# Patient Record
Sex: FEMALE | Race: WHITE
[De-identification: names, ages, dates, MRNs, and addresses within clinical notes are randomized per-mention and may not be internally consistent; named-entity substitution may affect disease eponyms.]

---

## 2017-03-21 ENCOUNTER — HOSPITAL ENCOUNTER (EMERGENCY)
Dept: HOSPITAL 62 - ER | Age: 52
Discharge: HOME | End: 2017-03-21
Payer: MEDICAID

## 2017-03-21 VITALS — SYSTOLIC BLOOD PRESSURE: 145 MMHG | DIASTOLIC BLOOD PRESSURE: 96 MMHG
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
## 2017-03-21 DIAGNOSIS — S29.9XXA: Primary | ICD-10-CM

## 2017-03-21 DIAGNOSIS — F17.200: ICD-10-CM

## 2017-03-21 PROCEDURE — 99283 EMERGENCY DEPT VISIT LOW MDM: CPT

## 2017-03-21 NOTE — ER DOCUMENT REPORT
HPI





- HPI


Patient complains to provider of: injured left side of ribs


Onset: Other - Friday


Quality of pain: Achy


Pain Level: 5


Context: 


51-year-old female pulled her trashcan on Friday and felt a pop in the left 

side of her chest lateral ribs.  She thinks she broke something.    No 

Shortness of breath.


Associated Symptoms: None


Exacerbated by: Movement, Coughing


Relieved by: Denies


Similar symptoms previously: No


Recently seen / treated by doctor: No





- ROS


ROS below otherwise negative: Yes


Systems Reviewed and Negative: Yes All other systems reviewed and negative





- DERM


Skin Color: Normal





Past Medical History





- General


Information source: Patient





- Social History


Smoking Status: Current Every Day Smoker


Chew tobacco use (# tins/day): No


Frequency of alcohol use: Occasional


Drug Abuse: None


Lives with: Family


Family History: Reviewed & Not Pertinent


Patient has suicidal ideation: No


Patient has homicidal ideation: No





- Medical History


Medical History: Negative


Renal/ Medical History: Denies: Hx Peritoneal Dialysis


Past Surgical History: Reports: Hx  Section, Hx Cholecystectomy, Hx 

Hysterectomy





Vertical Provider Document





- CONSTITUTIONAL


Agree With Documented VS: Yes


Exam Limitations: No Limitations





- INFECTION CONTROL


TRAVEL OUTSIDE OF THE [place] IN LAST 30 DAYS: No





- HEENT


HEENT: Atraumatic, Normocephalic





- NECK


Neck: Supple.  negative: Lymphadenopathy-Left, Lymphadenopathy-Right





- RESPIRATORY


Respiratory: Breath Sounds Normal, Other - tender left lateral chest wall under 

axilla over  muscle that extends to thoracic back


O2 Sat by Pulse Oximetry: 98





- CARDIOVASCULAR


Cardiovascular: Regular Rate, Regular Rhythm





- GI/ABDOMEN


Gastrointestinal: Abdomen Soft, Abdomen Non-Tender, No Organomegaly





- MUSCULOSKELETAL/EXTREMETIES


Musculoskeletal/Extremeties: MAEW, FROM





- NEURO


Level of Consciousness: Awake, Alert


Motor/Sensory: No Motor Deficit, No Sensory Deficit





- DERM


Integumentary: Warm, Dry, No Rash





Course





- Vital Signs


Vital signs: 


 











Temp Pulse Resp BP Pulse Ox


 


 98.2 F   95   20   145/96 H  98 


 


 17 13:32  17 13:32  17 13:32  17 13:32  17 13:32














Discharge





- Discharge


Clinical Impression: 


 left rib strain





Condition: Good


Disposition: HOME, SELF-CARE


Instructions:  Muscle Strain ([facility]), Ultram ([facility]), Acetaminophen


Additional Instructions: 


warm compress


to er if worse





Please complete the patient satisfaction survey if you get one, and return it.. 

If you do not receive a survey,  then you can go to the [facility] website, [email or web address] 

and place your comments about your very good care. Thank you very much. It was 

a pleasure being your medical provider today.


Prescriptions: 


Tramadol HCl [Ultram 50 mg Tablet] 50 mg PO ASDIR PRN #20 tablet


 PRN Reason: 


Forms:  Return to Work

## 2017-03-21 NOTE — ER DOCUMENT REPORT
ED Medical Screen (RME)





- General


Stated Complaint: LEFT RIB PAIN


Notes: 


Patient states she was pulling on trash can and felt a crack to her left ribs 

on Friday.  Complains of pain and shortness of breath when taking deep breaths.

  Patient has COPD and emphysema.  No fever.





I have greeted and performed a rapid initial assessment of this patient.  A 

comprehensive ED assessment and evaluation of the patient, analysis of test 

results and completion of the medical decision making process will be conducted 

by additional ED providers.


TRAVEL OUTSIDE OF THE [place] IN LAST 30 DAYS: No





- Related Data


Allergies/Adverse Reactions: 


 





codeine Allergy (Verified 16 15:33)


 


latex Allergy (Verified 16 15:33)


 


NSAIDS (Non-Steroidal Anti-Inflamma Allergy (Verified 16 15:33)


 











Past Medical History


Past Surgical History: Reports: Hx  Section, Hx Cholecystectomy, Hx 

Hysterectomy





Physical Exam





- Respiratory


Respiratory status: No respiratory distress


Breath sounds: Normal - tender left lateral ribs

## 2017-05-11 ENCOUNTER — HOSPITAL ENCOUNTER (EMERGENCY)
Dept: HOSPITAL 62 - ER | Age: 52
Discharge: HOME | End: 2017-05-11
Payer: MEDICAID

## 2017-05-11 VITALS — DIASTOLIC BLOOD PRESSURE: 81 MMHG | SYSTOLIC BLOOD PRESSURE: 130 MMHG

## 2017-05-11 DIAGNOSIS — Z90.710: ICD-10-CM

## 2017-05-11 DIAGNOSIS — F17.210: ICD-10-CM

## 2017-05-11 DIAGNOSIS — J44.9: ICD-10-CM

## 2017-05-11 DIAGNOSIS — L02.411: Primary | ICD-10-CM

## 2017-05-11 DIAGNOSIS — Z90.49: ICD-10-CM

## 2017-05-11 PROCEDURE — 87186 SC STD MICRODIL/AGAR DIL: CPT

## 2017-05-11 PROCEDURE — 87205 SMEAR GRAM STAIN: CPT

## 2017-05-11 PROCEDURE — 87077 CULTURE AEROBIC IDENTIFY: CPT

## 2017-05-11 PROCEDURE — 99283 EMERGENCY DEPT VISIT LOW MDM: CPT

## 2017-05-11 PROCEDURE — 87070 CULTURE OTHR SPECIMN AEROBIC: CPT

## 2017-05-11 PROCEDURE — 0H9BXZZ DRAINAGE OF RIGHT UPPER ARM SKIN, EXTERNAL APPROACH: ICD-10-PCS | Performed by: NURSE PRACTITIONER

## 2017-05-11 NOTE — ER DOCUMENT REPORT
HPI





- HPI


Patient complains to provider of: abscess


Onset: Other - 3 weeks


Onset/Duration: Persistent


Quality of pain: Achy, Burning


Severity: Severe


Pain Level: 5


Context: 


Patient presents emergency department with multiple abscesses to her right 

axilla.  Patient reports that she's had these abscesses since she shaved 

approximately 3 weeks ago.  She reports she's applied black salve and opened 

the sites herself with a needle.  She denies history of MRSA.  She denies fever 

vomiting diarrhea.


Associated Symptoms: None


Exacerbated by: Denies


Relieved by: Denies


Similar symptoms previously: No


Recently seen / treated by doctor: No





- DERM


Skin Color: Normal





Past Medical History





- General


Information source: Patient





- Social History


Smoking Status: Current Every Day Smoker


Cigarette use (# per day): Yes


Chew tobacco use (# tins/day): No


Frequency of alcohol use: Occasional


Drug Abuse: None


Occupation: store


Lives with: Family


Family History: Reviewed & Not Pertinent


Patient has suicidal ideation: No


Patient has homicidal ideation: No


Pulmonary Medical History: Reports: Hx COPD


Renal/ Medical History: Denies: Hx Peritoneal Dialysis


Past Surgical History: Reports: Hx  Section - x 2, Hx Cholecystectomy, 

Hx Hysterectomy





- Immunizations


Hx Diphtheria, Pertussis, Tetanus Vaccination: Yes





Vertical Provider Document





- CONSTITUTIONAL


Agree With Documented VS: Yes


Exam Limitations: No Limitations


General Appearance: WD/WN, Mild Distress - winces when abscesses probed, anxious





- INFECTION CONTROL


TRAVEL OUTSIDE OF THE [place] IN LAST 30 DAYS: No





- HEENT


HEENT: Atraumatic, Normocephalic





- NECK


Neck: Normal Inspection, Supple.  negative: Lymphadenopathy-Left, 

Lymphadenopathy-Right





- RESPIRATORY


Respiratory: Breath Sounds Normal, No Respiratory Distress


O2 Sat by Pulse Oximetry: 98





- CARDIOVASCULAR


Cardiovascular: Regular Rate





- GI/ABDOMEN


Gastrointestinal: Abdomen Soft





- MUSCULOSKELETAL/EXTREMETIES


Musculoskeletal/Extremeties: MAEW, FROM





- NEURO


Level of Consciousness: Awake, Alert, Appropriate


Motor/Sensory: No Motor Deficit





- DERM


Integumentary: Warm, Dry, Abscess - 3 abscesses under right axillae, fluctuant





Course





- Re-evaluation


Re-evalutation: 





17 10:44


3 abscesses were opened under the patient's right arm.  #1 horizontal 

laceration made large amount of drainage.  #2 T-incision made with a large 

amount of drainage area probed, #3 small abscess small incision made positive 

drainage.  Patient tolerated procedure well.  Patient instructed on Septra 

signs and symptoms of allergic reaction.  Patient also instructed on [place].  

She reports she's taken before without any problems.  She was instructed to 

return to the emergency department for increasing abscesses or concerns.  She 

verbalized understanding to all instructions.  Patient does have an appointment 

with her primary care provider next week.





- Vital Signs


Vital signs: 


 











Temp Pulse Resp BP Pulse Ox


 


 98.6 F   93   18   112/64   98 


 


 17 08:30  17 08:30  17 08:30  17 08:30  17 08:30














Procedures





- Incision and Drainage


  ** Right Axillae


Time completed: 10:40


Type: Simple


Anesthetic type: 1% Lidocaine


mL's of anesthetic: 5


I&D procedure: Betadine prep applied


Incision Method: Incision made by scalpel


Amount/type of drainage: patient had 3 abscesses under her right axilla with 

large amount of drainag





Discharge





- Discharge


Clinical Impression: 


 abscess incison and drainage





Condition: Stable


Disposition: HOME, SELF-CARE


Instructions:  Abscess ([facility]), Post Incision and Drainage, Trimethoprim-Sulfa (

[facility]), Oral Narcotic Medication ([facility])


Additional Instructions: 


*You have been treated for an abscess with incision and drainage


*Take medication as prescribed


*Monitor the site for signs of increasing infection such as increasing pain,


  redness, swelling, warmth


*Wash the site twice daily as discussed


*Follow up with a primary care provider as scheduled for recheck


*Contact the culture nurse sophy 9-4 at [phone] to inquire about your culture


*Return to ED for signs of increasing infection, worsening condition,


  changes, needs


Prescriptions: 


Hydrocodone/Acetaminophen [Norco 5-325 Tablet] 1 each PO QID #15 tablet


Sulfamethoxazole/Trimethoprim [Bactrim Ds Tablet] 1 each PO BID #20 tablet


Forms:  Return to Work

## 2020-02-21 ENCOUNTER — HOSPITAL ENCOUNTER (EMERGENCY)
Dept: HOSPITAL 62 - ER | Age: 55
Discharge: HOME | End: 2020-02-21
Payer: SELF-PAY

## 2020-02-21 VITALS — DIASTOLIC BLOOD PRESSURE: 51 MMHG | SYSTOLIC BLOOD PRESSURE: 126 MMHG

## 2020-02-21 DIAGNOSIS — M54.42: Primary | ICD-10-CM

## 2020-02-21 DIAGNOSIS — J44.9: ICD-10-CM

## 2020-02-21 DIAGNOSIS — F17.200: ICD-10-CM

## 2020-02-21 PROCEDURE — 99283 EMERGENCY DEPT VISIT LOW MDM: CPT

## 2020-02-21 NOTE — ER DOCUMENT REPORT
HPI





- HPI


Patient complains to provider of: Low back pain


Time Seen by Provider: 20 22:47


Pain Level: 5


Notes: 





54-year-old female to the emergency department with complaints of low left-sided

back pain that radiates down the back of her leg all the way down to her foot 

for the past 2 weeks.  She states that she has a known history of sciatica and 

this feels like an acute flare of it.  She states that she has been taking 

ibuprofen at home with not a lot of relief.  She denies any fevers or chills.  

She denies any IV drug abuse.  She denies any weakness in the leg.  She states 

that she has not had any bladder or bowel incontinence.  She has not had any 

urinary retention.  She not had any saddle paresthesia.  She does not see a 

specialist for her back.





- CONSTITUTIONAL


Constitutional: DENIES: Fever, Chills





- EENT


EENT: DENIES: Sore Throat, Ear Pain





- NEURO


Neurology: DENIES: Headache





- CARDIOVASCULAR


Cardiovascular: DENIES: Chest pain





- RESPIRATORY


Respiratory: DENIES: Trouble Breathing, Coughing





- GASTROINTESTINAL


Gastrointestinal: DENIES: Abdominal Pain, Nausea, Patient vomiting, Diarrhea





- MUSCULOSKELETAL


Musculoskeletal: REPORTS: Back Pain





- DERM


Skin Color: Normal


Skin Problems: None





Past Medical History





- General


Information source: Patient





- Social History


Smoking Status: Current Every Day Smoker


Frequency of alcohol use: None


Drug Abuse: None


Lives with: Spouse/Significant other


Family History: Reviewed & Not Pertinent


Patient has suicidal ideation: No


Patient has homicidal ideation: No


Pulmonary Medical History: Reports: Hx COPD


Renal/ Medical History: Denies: Hx Peritoneal Dialysis


Past Surgical History: Reports: Hx  Section - x 2, Hx Cholecystectomy, 

Hx Hysterectomy





- Immunizations


Hx Diphtheria, Pertussis, Tetanus Vaccination: Yes





Vertical Provider Document





- CONSTITUTIONAL


Agree With Documented VS: Yes


Exam Limitations: No Limitations


General Appearance: WD/WN, No Apparent Distress





- INFECTION CONTROL


TRAVEL OUTSIDE OF THE [place] IN LAST 30 DAYS: No





- HEENT


HEENT: Atraumatic, Normal ENT Exam, Normocephalic, PERRLA





- NECK


Neck: Normal Inspection, Supple





- RESPIRATORY


Respiratory: Breath Sounds Normal, No Respiratory Distress.  negative: Rales, 

Rhonchi, Wheezing





- CARDIOVASCULAR


Cardiovascular: Regular Rate, Regular Rhythm, No Murmur





- GI/ABDOMEN


Gastrointestinal: Abdomen Soft, Abdomen Non-Tender, No Organomegaly





- MUSCULOSKELETAL/EXTREMETIES


Notes: 





There is no tenderness to palpation over the midline cervical and thoracic spine

with no step-off or deformity.  There is mild tenderness to palpation over the 

lumbar midline spine with noted tenderness palpation over the lumbar paraspinal 

in the left lumbar sacral region.  Negative straight leg raise bilaterally.  

Patient is able to get up and walk around the triage room with a ease and 

actually states that walking and standing makes it better.  She has full range 

of motion of bilateral lower extremities against resistance with 5 out of 5 

strength in flexion and extension.





- NEURO


Level of Consciousness: Awake, Alert, Appropriate





- DERM


Integumentary: Warm, Dry, No Rash





Course





- Re-evaluation


Re-evalutation: 





Impression: Sciatica.  Will discharge the patient home with muscle relaxant, 

gabapentin low-dose, Medrol Dosepak.  Encouraged follow-up with orthopedist.  

Patient agrees with the plan.  Encouraged to return if any emergent back 

symptoms.  Doubt infectious etiology for back pain and and no emergent symptoms 

to suggest cauda equina.








- Vital Signs


Vital signs: 


                                        











Temp Pulse Resp BP Pulse Ox


 


 97.6 F   80   16   126/51 H  100 


 


 20 22:29  20 22:29  20 22:29  20 22:29  20 22:29














Discharge





- Discharge


Clinical Impression: 


Low back pain


Qualifiers:


 Chronicity: acute Back pain laterality: left Sciatica presence: with sciatica 

Sciatica laterality: sciatica of left side Qualified Code(s): M54.42 - Lumbago 

with sciatica, left side





Condition: Stable


Disposition: HOME, SELF-CARE


Instructions:  Sciatica ([facility])


Additional Instructions: 


TAKE MEDICINES AS PRESCRIBED.  FOLLOW UP WITH ORTHOPEDIST.   ALTERNATE BETWEEN 

ICE AND HEAT. 


Prescriptions: 


Methylprednisolone [Medrol Dosepack (4 mg/Tab) 21 Tab/Dosepak] 4 mg PO ASDIR PRN

#21 tab.ds.pk


 PRN Reason: 


Gabapentin [Neurontin 100 mg Capsule] 100 mg PO Q8H #15 capsule


Methocarbamol [Robaxin 500 mg Tablet] 500 mg PO QID PRN #20 tablet


 PRN Reason: 


Referrals: 


CHEYANNE LUCERO JR, DO [ACTIVE PROVISIONAL STAFF] - Follow up in 3-5 days

## 2020-03-04 ENCOUNTER — HOSPITAL ENCOUNTER (EMERGENCY)
Dept: HOSPITAL 62 - ER | Age: 55
LOS: 1 days | Discharge: HOME | End: 2020-03-05
Payer: SELF-PAY

## 2020-03-04 DIAGNOSIS — M54.32: Primary | ICD-10-CM

## 2020-03-04 DIAGNOSIS — F17.200: ICD-10-CM

## 2020-03-04 DIAGNOSIS — Z88.5: ICD-10-CM

## 2020-03-04 DIAGNOSIS — Z79.899: ICD-10-CM

## 2020-03-04 DIAGNOSIS — J44.9: ICD-10-CM

## 2020-03-04 DIAGNOSIS — Z91.040: ICD-10-CM

## 2020-03-04 DIAGNOSIS — Z88.6: ICD-10-CM

## 2020-03-04 PROCEDURE — 99283 EMERGENCY DEPT VISIT LOW MDM: CPT

## 2020-03-04 PROCEDURE — 93971 EXTREMITY STUDY: CPT

## 2020-03-04 NOTE — ER DOCUMENT REPORT
ED Medical Screen (RME)





- General


Chief Complaint: Leg Pain


Stated Complaint: LEG PAIN


Time Seen by Provider: 20 19:34


Notes: 





HPI:  54 year old female presenting to the ER for evaluation of continuing left 

leg pain.  States she has had pain from her lower back through the gluteal 

region down the left leg in the posterior aspect for 3 weeks.  States she was 

here a week and a half ago and was treated for sciatic issues with prednisone a

nd gabapentin but it did not help.  She did not follow-up with her primary care 

provider or orthopedist stating that she does not have insurance to do so.  No 

incontinence of urine or bowel





I have greeted and performed a rapid initial assessment of this patient.  A 

comprehensive ED assessment and evaluation of the patient, analysis of test 

results and completion of the medical decision making process will be conducted 

by additional ED providers








PHYSICAL EXAMINATION:





GENERAL: Well-appearing, well-nourished and in mild acute distress.


HEAD: Atraumatic, normocephalic.


EYES:  sclera anicteric, conjunctiva are normal.


ENT: Moist mucous membranes.


NECK: Normal range of motion


LUNGS: Normal work of breathing


HEART: 2+ radial pulses bilaterally


ABD: limited by positioning for exam in triage.  


EXTREMITIES:  No cyanosis.  Mild tenderness to the left gluteal and posterior 

hamstring and calf region on palpation no visible edema


NEUROLOGICAL: No focal neurological deficits. Moves all extremities 

spontaneously and on command.


PSYCH: Normal mood, normal affect.


SKIN: Warm, Dry, normal turgor, no rashes or lesions noted.








TRAVEL OUTSIDE OF THE [place] IN LAST 30 DAYS: No





- Related Data


Allergies/Adverse Reactions: 


                                        





codeine Allergy (Verified 17 08:29)


   


latex Allergy (Verified 17 08:29)


   


NSAIDS (Non-Steroidal Anti-Inflamma Allergy (Verified 17 08:29)


   











Past Medical History


Pulmonary Medical History: Reports: Hx COPD


Renal/ Medical History: Denies: Hx Peritoneal Dialysis


Past Surgical History: Reports: Hx  Section - x 2, Hx Cholecystectomy, 

Hx Hysterectomy





- Immunizations


Hx Diphtheria, Pertussis, Tetanus Vaccination: Yes





Physical Exam





- Vital signs


Vitals: 





                                        











Temp Pulse Resp BP Pulse Ox


 


 98.0 F   75   16   126/61 H  98 


 


 20 17:50  20 17:50  20 17:50  20 17:50  20 17:50














Course





- Vital Signs


Vital signs: 





                                        











Temp Pulse Resp BP Pulse Ox


 


 98.0 F   75   16   126/61 H  98 


 


 20 17:50  20 17:50  20 17:50  20 17:50  20 17:50

## 2020-03-05 VITALS — SYSTOLIC BLOOD PRESSURE: 130 MMHG | DIASTOLIC BLOOD PRESSURE: 71 MMHG

## 2020-03-05 NOTE — ER DOCUMENT REPORT
ED General





- General


Chief Complaint: Low Back Pain


Stated Complaint: LEG PAIN


Time Seen by Provider: 20 19:34


TRAVEL OUTSIDE OF THE [place] IN LAST 30 DAYS: No





- HPI


Notes: 





Patient is a 54-year-old female who presents the emergency department for 

evaluation of pain in her left buttock that radiates down her left leg.  She 

states is been present for about 3 weeks.  She denies any bowel or bladder in

continence, no saddle anesthesia, no focal numbness or weakness.  She states 

that the pain is not related to an injury.  Certain positions make it worse.  

She has been given Zanaflex, steroids, Robaxin.  She states the Zanaflex has 

helped minimally, but nothing over-the-counter seems to be helping at all.  She 

has not followed up with primary care as she currently does not have insurance. 

She has not contacted caring [facility] clinic.  She denies any fevers or chills.

 She has some nausea when her pain is severe she denies any vomiting.





- Related Data


Allergies/Adverse Reactions: 


                                        





codeine Allergy (Verified 17 08:29)


   


latex Allergy (Verified 17 08:29)


   


NSAIDS (Non-Steroidal Anti-Inflamma Allergy (Verified 17 08:29)


   








Home Medications: oloratidine 10 mg qday.  levothryxine 25 mcg qday.  zanaflex 4

mg prn.  gabapentin 100 mg q8h.  prozac 20 mg qday





Past Medical History





- General


Information source: Patient





- Social History


Smoking Status: Current Every Day Smoker


Family History: Reviewed & Not Pertinent


Patient has suicidal ideation: No


Patient has homicidal ideation: No


Pulmonary Medical History: Reports: Hx COPD


Renal/ Medical History: Denies: Hx Peritoneal Dialysis


GI Medical History: Reports: Hx Gastroesophageal Reflux Disease


Past Surgical History: Reports: Hx  Section - x 2, Hx Cholecystectomy, 

Hx Hysterectomy





- Immunizations


Hx Diphtheria, Pertussis, Tetanus Vaccination: Yes





Review of Systems





- Review of Systems


Gastrointestinal: See HPI


Musculoskeletal: See HPI


-: Yes All other systems reviewed and negative





Physical Exam





- Vital signs


Vitals: 





                                        











Temp Pulse Resp BP Pulse Ox


 


 98.0 F   75   16   126/61 H  98 


 


 20 17:50  20 17:50  20 17:50  20 17:50  20 17:50














- Notes


Notes: 





Is a 54-year-old female who appears her stated age in no acute distress.  Vital 

signs reviewed, please refer to chart. Head is normocephalic, atraumatic.  

Pupils equal round, reactive to light.  Neck is supple without meningismus.  

Heart is regular rate and rhythm.  Lungs are clear to auscultation bilaterally. 

Abdomen is soft, nontender, normoactive bowel sounds throughout.  Examination of

the spine yields no midline tenderness or step-off.  No paraspinal musculature 

tenderness is appreciated.  She is tender over the left ischio without any 

associated skin changes.  Negative straight leg raise bilaterally.  Patellar and

Achilles reflexes are diminished but symmetrical.  Capillary fill is brisk, 

sensation is intact, strength is +5/5 bilateral lower extremities.  Extremities 

without cyanosis, clubbing. Posterior calves are nontender.  Peripheral pulses 

are equal.  Skin is warm and dry.  Patient is awake, alert, neurological exam is

nonfocal.





Course





- Re-evaluation


Re-evalutation: 





20 00:32


Patient presents to the emergency department for evaluation.  Her findings are 

consistent with sciatica versus bursitis.  She had a Doppler which was 

preliminarily read as negative.  She is given a Percocet here.  She is already 

on appropriate muscle relaxers.  I will give her a small amount of Flexeril and 

a prednisone taper.  She is told she needs to follow-up with primary care.  I 

explained to the patient that [facility] will see people without 

insurance and may be able to be helpful.  She voiced understanding to this and 

was discharged.


20 00:36








- Vital Signs


Vital signs: 





                                        











Temp Pulse Resp BP Pulse Ox


 


 97.8 F   63   20   136/70 H  98 


 


 20 20:57  20 20:57  20 20:57  20 20:57  20 20:57














Discharge





- Discharge


Clinical Impression: 


 Sciatica of left side





Condition: Stable


Disposition: HOME, SELF-CARE


Instructions:  Warm Packs ([facility]), Low Back Pain ([facility]), Sciatica ([facility])


Additional Instructions: 


Take medications as prescribed.  Follow-up with primary care.  You need to 

contact [facility] for establishment of care and follow-up.  Return

to the emergency department with worsening or new concerning symptoms of any 

sort.

## 2020-03-05 NOTE — XCELERA REPORT
[address] [place]

                             [facility] [address]

                               Tel: [phone]

                               Fax: [phone]



                       Lower Extremity Venous Evaluation

_______________________________________________________________________________



_______________________________________________________________________________

Procedure: Color flow and duplex imaging of the veins of the left lower

extremity as well as the right Common Femoral vein.





_______________________________________________________________________________



_______________________________________________________________________________



Right Sided Venous Evaluation

The right common femoral vein is fully compressible. Spontaneous and phasic

flow is present in the right common femoral vein.



Left Sided Venous Evaluation

Normal vessel filling wall to wall, compression and augmentation as well as

Colour flow down to the infrageniculate veins.



_______________________________________________________________________________



Interpretation Summary

No duplex evidence of DVT or obstruction in the left lower extremity nor in

the right Common Femoral vein.

_______________________________________________________________________________



Name: ELEAZAR FOX

MRN: [id number]

Age: 54 yrs

Gender: Female                                         : 1965

Patient Status: Emergency                              Patient Location: ER

Account #: [id number]

Study Date: 2020 08:30 PM

                            Accession #: [id number]

Reason For Study: left leg pain

Ordering Physician: NOLBERTO CALLES

Performed By: Josie Manning

Electronically signed by:      Lennox Williams      on 2020 12:37 PM



CC: NOLBERTO CALLES

>

Williams, Lennox